# Patient Record
Sex: FEMALE | Race: WHITE | NOT HISPANIC OR LATINO | Employment: UNEMPLOYED | URBAN - METROPOLITAN AREA
[De-identification: names, ages, dates, MRNs, and addresses within clinical notes are randomized per-mention and may not be internally consistent; named-entity substitution may affect disease eponyms.]

---

## 2021-12-11 ENCOUNTER — OFFICE VISIT (OUTPATIENT)
Dept: URGENT CARE | Facility: URGENT CARE | Age: 21
End: 2021-12-11
Payer: COMMERCIAL

## 2021-12-11 VITALS
OXYGEN SATURATION: 98 % | TEMPERATURE: 97.9 F | SYSTOLIC BLOOD PRESSURE: 118 MMHG | WEIGHT: 136 LBS | HEART RATE: 82 BPM | DIASTOLIC BLOOD PRESSURE: 73 MMHG

## 2021-12-11 DIAGNOSIS — N89.8 VAGINAL DISCHARGE: Primary | ICD-10-CM

## 2021-12-11 LAB
CLUE CELLS: ABNORMAL
TRICHOMONAS, WET PREP: ABNORMAL
WBC'S/HIGH POWER FIELD, WET PREP: ABNORMAL
YEAST, WET PREP: ABNORMAL

## 2021-12-11 PROCEDURE — 87591 N.GONORRHOEAE DNA AMP PROB: CPT | Performed by: NURSE PRACTITIONER

## 2021-12-11 PROCEDURE — 99203 OFFICE O/P NEW LOW 30 MIN: CPT | Performed by: NURSE PRACTITIONER

## 2021-12-11 PROCEDURE — 87210 SMEAR WET MOUNT SALINE/INK: CPT | Performed by: NURSE PRACTITIONER

## 2021-12-11 PROCEDURE — 87491 CHLMYD TRACH DNA AMP PROBE: CPT | Performed by: NURSE PRACTITIONER

## 2021-12-11 RX ORDER — NORGESTIMATE AND ETHINYL ESTRADIOL 7DAYSX3 28
1 KIT ORAL DAILY
COMMUNITY
Start: 2021-09-28

## 2021-12-11 RX ORDER — METHYLPHENIDATE HYDROCHLORIDE 36 MG/1
TABLET, EXTENDED RELEASE ORAL
COMMUNITY
Start: 2021-05-26

## 2021-12-11 NOTE — PROGRESS NOTES
Assessment & Plan     Vaginal discharge    - Wet prep - lab collect  - Chlamydia & Gonorrhea by PCR, [facility] - Clinic Collect      Reviewed wet prep during visit showing no yeast infection, BV, trichomonas. Chlamydia and gonorrhea testing in process, will notify if positive and treat. Recommended consistent condom use, wearing cotton panties, keeping genitals clean and dry.     Follow-up with GYN if symptoms persist for 3 days, and sooner if symptoms worsen or new symptoms develop.     Discussed red flag symptoms which warrant immediate visit in emergency room    All questions were answered and patient verbalized understanding. AVS reviewed with patient.     Germania Townsend, DNP, APRN, CNP 12/11/2021 5:30 PM  [facility] Guadalupe is a 21 year old female who presents to clinic today for the following health issues:  Chief Complaint   Patient presents with     Vaginal Problem     GYN Complaint    Onset of symptoms was 1 day(s) ago.  Course of illness is worsening.    Current and Associated symptoms: vaginal discharge which is whitish  Denies fever, chills, abdominal pain, flank pain, genital sores, odor, dysuria, urinary urgency, frequency, hematuria  Treatment measures tried include:  None  Sexually active: yes, single partner, male partner, contraception - OCP and condoms  Predisposing factors: None  Hx of previous symptom: none  No history of STD.     Problem list, Medication list, Allergies, and Medical history reviewed in EPIC.    ROS:  Review of systems negative except for noted above        Objective    /73   Pulse 109   Temp 97.9  F (36.6  C) (Tympanic)   Wt 61.7 kg (136 lb)   SpO2 98%   Physical Exam  Constitutional:       General: She is not in acute distress.     Appearance: She is not toxic-appearing or diaphoretic.   Abdominal:      General: Bowel sounds are normal. There is no distension.      Palpations: Abdomen is soft.       Tenderness: There is no abdominal tenderness. There is no right CVA tenderness, left CVA tenderness, guarding or rebound.   Lymphadenopathy:      Cervical: No cervical adenopathy.   Skin:     General: Skin is warm and dry.   Neurological:      Mental Status: She is alert.        Labs:  No results found for any visits on 12/11/21.

## 2021-12-11 NOTE — LETTER
December 13, 2021      Anayeli Henderson  [address] [place] [address]        Dear ,    We are writing to inform you of your test results.    Your test results fall within the expected range(s) and negative gonorrhea and chlamydia.     Enclosed is a copy of these results.  Resulted Orders   Chlamydia & Gonorrhea by PCR, [facility] - Clinic Collect   Result Value Ref [facility]    Chlamydia Trachomatis Negative Negative      Comment:      Negative for C. trachomatis rRNA by transcription mediated amplification.   A negative result by transcription mediated amplification does not preclude the presence of infection because results are dependent on proper and adequate collection, absence of inhibitors and sufficient rRNA to be detected.    Neisseria gonorrhoeae Negative Negative      Comment:      Negative for N. gonorrhoeae rRNA by transcription mediated amplification. A negative result by transcription mediated amplification does not preclude the presence of C. trachomatis infection because results are dependent on proper and adequate collection, absence of inhibitors and sufficient rRNA to be detected.   Wet prep - lab collect   Result Value Ref [facility]    Trichomonas Absent Absent    Yeast Absent Absent    Clue Cells Absent Absent    WBCs/high power field 3+ (A) None   If you have any questions or concerns, please call the clinic at the number listed above.   Thank you for choosing [facility].      Sincerely,      Germania Townsend NP

## 2021-12-11 NOTE — PATIENT INSTRUCTIONS
Patient Education     Preventing Vaginal Infection  These steps can help you stay comfortable when treating a vaginal infection. They also help prevent future vaginal infections.   Keeping a healthy balance  Factors that change the normal balance in the vagina can lead to a vaginal infection. To help keep the balance normal, try these tips:     Change out of wet bathing suits and damp exercise clothes as soon as possible. Yeast thrive in a warm, moist environment.    Don't wear tight pants. Choose cotton underwear and stockings that have a cotton crotch. Cotton keeps you cooler and drier than synthetics.    Don't douche unless advised by your healthcare provider. Douching can destroy friendly bacteria and change the vagina's normal balance.    Wipe from front to back after using the toilet. This prevents bacteria from spreading from the anus to the vulva.    Wash the vulva with mild, unscented soap or with plain water.    Wash your diaphragm, spermicide applicators, and sex toys with mild soap and water after use. Dry them thoroughly before putting them away.    Change tampons often (every 2 hours to 4 hours). Leaving a tampon in for too long may disrupt the balance of vaginal bacteria.    Don't use vaginal sprays, scented toilet paper and soaps, and deodorant tampons or pads. These can cause vaginal irritation  Staying healthy overall  Good overall health can help you resist infection. To be healthier:     Help protect yourself from STIs (sexually transmitted infections) by using latex condoms during sex. Ask your healthcare provider for more information about safer sex.    Eat a variety of healthy foods.    Exercise regularly.    Get enough rest and sleep.    Stay at a healthy weight. If you need to lose weight, ask your provider for advice on how to start.  [employer] last reviewed this educational content on 6/1/2020 2000-2021 [employer]. All rights reserved. This information is not intended as  a substitute for professional medical care. Always follow your healthcare professional's instructions.

## 2021-12-13 LAB
C TRACH DNA SPEC QL PROBE+SIG AMP: NEGATIVE
N GONORRHOEA DNA SPEC QL NAA+PROBE: NEGATIVE